# Patient Record
Sex: FEMALE | NOT HISPANIC OR LATINO | URBAN - METROPOLITAN AREA
[De-identification: names, ages, dates, MRNs, and addresses within clinical notes are randomized per-mention and may not be internally consistent; named-entity substitution may affect disease eponyms.]

---

## 2023-07-21 LAB
CHLAMYDIA TRACH., AMPLIFIED: NEGATIVE
N. GONORRHEA, AMPLIFIED: NEGATIVE

## 2023-12-14 ENCOUNTER — APPOINTMENT (OUTPATIENT)
Dept: OBSTETRICS AND GYNECOLOGY | Facility: CLINIC | Age: 23
End: 2023-12-14
Payer: COMMERCIAL

## 2023-12-26 ASSESSMENT — ENCOUNTER SYMPTOMS
SHORTNESS OF BREATH: 0
FEVER: 0
NAUSEA: 0
ABDOMINAL PAIN: 0
DYSURIA: 0
VOMITING: 0
HEADACHES: 0
CHILLS: 0
COUGH: 0
COLOR CHANGE: 0
FATIGUE: 0
DIZZINESS: 0
UNEXPECTED WEIGHT CHANGE: 0

## 2023-12-26 NOTE — PROGRESS NOTES
NP to practice - here for annual?  Saw Janie SEGUN Valadez, MUNDO-LIBRADO 7/19/23 for annual GYN exam?  Pap 7/2023 negative, G/C negative    Subjective   Meenu Mayorga is a 23 y.o. female who is here for a routine exam.     Complaints:***  Periods: regular***  Dysmenorrhea: none***    Current contraception: OCP  History of abnormal Pap smear: no  History of abnormal mammogram: no      OB History    No obstetric history on file.          Review of Systems   Constitutional:  Negative for chills, fatigue, fever and unexpected weight change.   Respiratory:  Negative for cough and shortness of breath.    Gastrointestinal:  Negative for abdominal pain, nausea and vomiting.   Genitourinary:  Negative for dyspareunia, dysuria, pelvic pain and vaginal discharge.   Skin:  Negative for color change and rash.   Neurological:  Negative for dizziness and headaches.       Objective   There were no vitals taken for this visit.       General:   Alert and oriented, in no acute distress   Neck: Supple. No visible thyromegaly.    Breast/Axilla: Normal to palpation bilaterally without masses, skin changes, or nipple discharge.    Abdomen: Soft, non-tender, without masses or organomegaly   Vulva: Normal architecture without erythema, masses, or lesions.    Vagina: Normal mucosa without lesions, masses, or atrophy. No abnormal vaginal discharge.    Cervix: Normal without masses, lesions, or signs of cervicitis   Uterus: Normal, mobile, non-enlarged uterus   Adnexa: Normal without masses or lesions   Pelvic Floor normal   Psych Normal affect. Normal mood.      Assessment/Plan   -UTD on pap smear, next due 7/2025.  -UTD on STI screening, completed 7/2023 negative for both G/C.    Nova Olson PA-C

## 2023-12-27 ENCOUNTER — OFFICE VISIT (OUTPATIENT)
Dept: OBSTETRICS AND GYNECOLOGY | Facility: CLINIC | Age: 23
End: 2023-12-27
Payer: COMMERCIAL

## 2023-12-27 RX ORDER — ETONOGESTREL 68 MG/1
1 IMPLANT SUBCUTANEOUS ONCE
COMMUNITY

## 2023-12-27 ASSESSMENT — LIFESTYLE VARIABLES
SKIP TO QUESTIONS 9-10: 1
AUDIT-C TOTAL SCORE: 2
HOW OFTEN DO YOU HAVE A DRINK CONTAINING ALCOHOL: 2-4 TIMES A MONTH
HOW OFTEN DO YOU HAVE SIX OR MORE DRINKS ON ONE OCCASION: NEVER
HOW MANY STANDARD DRINKS CONTAINING ALCOHOL DO YOU HAVE ON A TYPICAL DAY: 1 OR 2

## 2023-12-27 ASSESSMENT — PATIENT HEALTH QUESTIONNAIRE - PHQ9
2. FEELING DOWN, DEPRESSED OR HOPELESS: NOT AT ALL
1. LITTLE INTEREST OR PLEASURE IN DOING THINGS: NOT AT ALL
SUM OF ALL RESPONSES TO PHQ9 QUESTIONS 1 & 2: 0

## 2023-12-27 ASSESSMENT — ENCOUNTER SYMPTOMS
DEPRESSION: 0
LOSS OF SENSATION IN FEET: 0
OCCASIONAL FEELINGS OF UNSTEADINESS: 0

## 2024-01-16 ASSESSMENT — ENCOUNTER SYMPTOMS
ABDOMINAL PAIN: 0
SHORTNESS OF BREATH: 0
FEVER: 0
FATIGUE: 0

## 2024-01-16 NOTE — PROGRESS NOTES
Subjective   Patient ID: Meenu Mayorga is a 23 y.o. female who presents for Annual Exam (Pt is here for physical and discuss her  sinus issues  /nr).    HPI   Sinus congestion - had sinus surgery 2-3 years ago. Still has chronic nasal congestion daily. Was told she had a deviated septum but did not FU.     Weight - lifetime struggle. Weighs the most she ever has. Exercises regularly - mix of weight training/cardio. Tries to get a lot of protein in her diet, vegetables, limits carbs. Has been trying to cut back on processed foods. +FamHx overweight. She does have nexplanon implant and plans on discontinuing soon.    She admits to obsessions w/weight earlier in her life but this is improving. Previously treated for KAYLI w/celexa but she is no longer on this medication and was improving w/counseling.    HM: pap 7/2023 nml.  Shx:  at [place], not currently sexually active    Review of Systems   Constitutional:  Negative for fatigue and fever.   Respiratory:  Negative for shortness of breath.    Cardiovascular:  Negative for chest pain.   Gastrointestinal:  Negative for abdominal pain.   Skin:  Negative for rash.       Objective   /72   Pulse 59   Wt 77.2 kg (170 lb 3.2 oz)   SpO2 98%   BMI 28.32 kg/m²     Physical Exam  Vitals reviewed.   Constitutional:       Appearance: Normal appearance.   HENT:      Head: Normocephalic and atraumatic.      Right Ear: Tympanic membrane and ear canal normal.      Left Ear: Tympanic membrane and ear canal normal.      Nose: Nose normal.      Mouth/Throat:      Mouth: Mucous membranes are moist.      Pharynx: No oropharyngeal exudate.   Eyes:      Extraocular Movements: Extraocular movements intact.      Conjunctiva/sclera: Conjunctivae normal.      Pupils: Pupils are equal, round, and reactive to light.   Cardiovascular:      Rate and Rhythm: Normal rate and regular rhythm.      Heart sounds: Normal heart sounds.   Pulmonary:      Effort: Pulmonary effort is  normal.      Breath sounds: Normal breath sounds. No wheezing.   Abdominal:      General: There is no distension.      Palpations: Abdomen is soft.      Tenderness: There is no abdominal tenderness.   Musculoskeletal:         General: No tenderness.      Cervical back: Normal range of motion and neck supple.   Skin:     General: Skin is warm and dry.      Findings: No rash.   Neurological:      General: No focal deficit present.      Mental Status: She is alert. Mental status is at baseline.   Psychiatric:         Mood and Affect: Mood normal.         Assessment/Plan   Problem List Items Addressed This Visit    None  Visit Diagnoses         Codes    Routine medical exam    -  Primary Z00.00    Chronic sinusitis, unspecified location     J32.9    Relevant Orders    CT sinus wo IV contrast    Screening for diabetes mellitus     Z13.1    Relevant Orders    Comprehensive Metabolic Panel    Screening, lipid     Z13.220    Relevant Orders    Lipid Panel    Weight gain     R63.5    Relevant Orders    TSH with reflex to Free T4 if abnormal          Given information on functional med to help w/weight loss. Recommend books, podcasts, etc.

## 2024-01-18 ENCOUNTER — OFFICE VISIT (OUTPATIENT)
Dept: PRIMARY CARE | Facility: CLINIC | Age: 24
End: 2024-01-18
Payer: COMMERCIAL

## 2024-01-18 VITALS
OXYGEN SATURATION: 98 % | DIASTOLIC BLOOD PRESSURE: 72 MMHG | WEIGHT: 170.2 LBS | SYSTOLIC BLOOD PRESSURE: 114 MMHG | HEART RATE: 59 BPM | BODY MASS INDEX: 28.32 KG/M2

## 2024-01-18 DIAGNOSIS — R63.5 WEIGHT GAIN: ICD-10-CM

## 2024-01-18 DIAGNOSIS — Z00.00 ROUTINE MEDICAL EXAM: Primary | ICD-10-CM

## 2024-01-18 DIAGNOSIS — Z13.1 SCREENING FOR DIABETES MELLITUS: ICD-10-CM

## 2024-01-18 DIAGNOSIS — Z13.220 SCREENING, LIPID: ICD-10-CM

## 2024-01-18 DIAGNOSIS — J32.9 CHRONIC SINUSITIS, UNSPECIFIED LOCATION: ICD-10-CM

## 2024-01-18 PROCEDURE — 1036F TOBACCO NON-USER: CPT | Performed by: PHYSICIAN ASSISTANT

## 2024-01-18 PROCEDURE — 99385 PREV VISIT NEW AGE 18-39: CPT | Performed by: PHYSICIAN ASSISTANT

## 2024-01-18 ASSESSMENT — COLUMBIA-SUICIDE SEVERITY RATING SCALE - C-SSRS
2. HAVE YOU ACTUALLY HAD ANY THOUGHTS OF KILLING YOURSELF?: NO
1. IN THE PAST MONTH, HAVE YOU WISHED YOU WERE DEAD OR WISHED YOU COULD GO TO SLEEP AND NOT WAKE UP?: NO
6. HAVE YOU EVER DONE ANYTHING, STARTED TO DO ANYTHING, OR PREPARED TO DO ANYTHING TO END YOUR LIFE?: NO

## 2024-01-18 ASSESSMENT — PATIENT HEALTH QUESTIONNAIRE - PHQ9
SUM OF ALL RESPONSES TO PHQ9 QUESTIONS 1 AND 2: 0
1. LITTLE INTEREST OR PLEASURE IN DOING THINGS: NOT AT ALL
2. FEELING DOWN, DEPRESSED OR HOPELESS: NOT AT ALL

## 2024-01-18 ASSESSMENT — PAIN SCALES - GENERAL: PAINLEVEL: 0-NO PAIN

## 2024-01-18 NOTE — PATIENT INSTRUCTIONS
Dr. Rickie Garland  [facility] - Dr. Emeterio Perez    Functional Medicine is a systems biology-based approach that focuses on identifying and addressing the root cause of disease. A diagnosis can be the result of more than one cause. For example, depression can be caused by many different factors, including inflammation. Likewise, a cause such as inflammation may lead to a number of different diagnoses, including depression. The precise manifestation of each cause depends on the individual’s genes, environment, and lifestyle, and only treatments that address the right cause will have lasting benefit beyond symptom suppression.    Dr. Dena Guajardo ([facility])  [phone]  [address] [place] [address]    Opal Adler, NO ([facility])  [phone]  [address] [place] [address]    Dr. Nohemy Rodriguez  [phone] [address] [place] [address]    [facility]  [phone] [address] [place] [address]    Dr. Lanette Post ([facility])  [phone] [address]  [place], [place]

## 2024-02-16 ENCOUNTER — TELEPHONE (OUTPATIENT)
Dept: PRIMARY CARE | Facility: CLINIC | Age: 24
End: 2024-02-16
Payer: COMMERCIAL

## 2024-02-16 DIAGNOSIS — J34.2 DEVIATED SEPTUM: Primary | ICD-10-CM

## 2024-02-16 DIAGNOSIS — J32.0 CHRONIC MAXILLARY SINUSITIS: ICD-10-CM

## 2024-02-16 NOTE — TELEPHONE ENCOUNTER
CT scan shows chronic maxillary sinus disease as well as deviated septum. Recommend FU w/ENT. Referral placed.

## 2024-02-21 ENCOUNTER — APPOINTMENT (OUTPATIENT)
Dept: RADIOLOGY | Facility: CLINIC | Age: 24
End: 2024-02-21
Payer: COMMERCIAL

## 2024-02-26 NOTE — TELEPHONE ENCOUNTER
Called and spoke with pt, who states that she already received results. Also, gave pt referral information as she wanted to call and schedule an appt with ENT.

## 2024-03-20 ENCOUNTER — OFFICE VISIT (OUTPATIENT)
Dept: OBSTETRICS AND GYNECOLOGY | Facility: CLINIC | Age: 24
End: 2024-03-20
Payer: COMMERCIAL

## 2024-03-20 VITALS
BODY MASS INDEX: 27.16 KG/M2 | WEIGHT: 163 LBS | HEIGHT: 65 IN | SYSTOLIC BLOOD PRESSURE: 114 MMHG | DIASTOLIC BLOOD PRESSURE: 75 MMHG

## 2024-03-20 DIAGNOSIS — Z30.46 ENCOUNTER FOR NEXPLANON REMOVAL: Primary | ICD-10-CM

## 2024-03-20 PROCEDURE — 1036F TOBACCO NON-USER: CPT

## 2024-03-20 PROCEDURE — 11982 REMOVE DRUG IMPLANT DEVICE: CPT

## 2024-03-20 ASSESSMENT — ENCOUNTER SYMPTOMS
OCCASIONAL FEELINGS OF UNSTEADINESS: 0
DEPRESSION: 0
LOSS OF SENSATION IN FEET: 0

## 2024-03-20 ASSESSMENT — PATIENT HEALTH QUESTIONNAIRE - PHQ9
SUM OF ALL RESPONSES TO PHQ9 QUESTIONS 1 & 2: 0
2. FEELING DOWN, DEPRESSED OR HOPELESS: NOT AT ALL
1. LITTLE INTEREST OR PLEASURE IN DOING THINGS: NOT AT ALL

## 2024-03-20 ASSESSMENT — LIFESTYLE VARIABLES
HOW MANY STANDARD DRINKS CONTAINING ALCOHOL DO YOU HAVE ON A TYPICAL DAY: 1 OR 2
SKIP TO QUESTIONS 9-10: 1
HOW OFTEN DO YOU HAVE SIX OR MORE DRINKS ON ONE OCCASION: NEVER
HOW OFTEN DO YOU HAVE A DRINK CONTAINING ALCOHOL: MONTHLY OR LESS
AUDIT-C TOTAL SCORE: 1

## 2024-03-20 ASSESSMENT — PAIN SCALES - GENERAL: PAINLEVEL: 0-NO PAIN

## 2024-03-20 NOTE — PROGRESS NOTES
Subjective   Meenu Mayorga is a 23 y.o. female who is here as a new patient to have her Nexplanon removed. Initially inserted 1-1.5 years ago at [facility] (2nd Nexplanon). Patient no longer desires to have Nexplanon; would like to take a break from hormonal birth control methods. Plans to track cycles naturally and use condoms when active.    Nexplanon Removal Note  Procedure:    Implant identified.  Left upper arm prepped with Betadinex3.  1% lidocaine injected at planned incision site.  A vertical incision 2-3 mm was performed with an 11-blade scalpel at the distal end of implant.  There was some difficulty removing implant; assistance was provided by Dr. Dey. The implant was then removed using hemostat.  The implant was inspected and found to be intact and complete and was discarded.  Steri strips and then a bandaid and gauze pressure wrap dressing were applied to the site.  After removal instructions were given and verbally reviewed with the patient who acknowledged her understanding - including ibuprofen and ice to the area prn.       Difficulties with the implant removal procedure?  See noted above    Birth control plans are condoms, however not currently active.      Insertion of Contraceptive Capsule    Date/Time: 3/20/2024 4:39 PM    Performed by: Nova Olson PA-C  Authorized by: Nova Olson PA-C    Consent:     Consent obtained:  Verbal and written    Consent given by:  Patient    Procedural risks discussed:  Bleeding, possible continued pain, infection and damage to other organs    Patient questions answered: yes      Patient agrees, verbalizes understanding, and wants to proceed: yes      Instructions and paperwork completed: yes    Universal Protocol:     Patient states understanding of procedure being performed: yes      Site marked: yes    Indication:     Indication: Presence of non-biodegradable drug delivery implant    Pre-procedure:     Pre-procedure timeout performed: yes      Prepped  with: povidone-iodine      Local anesthetic:  Lidocaine without epinephrine    The site was cleaned and prepped in a sterile fashion: yes    Procedure:     Procedure:  Removal    Small stab incision was made in arm: yes      Left/right:  Left    Site was closed with steri-strips and pressure bandage applied: yes    Comments:      Removed successfully and intact

## 2024-04-02 ENCOUNTER — LAB (OUTPATIENT)
Dept: LAB | Facility: LAB | Age: 24
End: 2024-04-02
Payer: COMMERCIAL

## 2024-04-02 DIAGNOSIS — R63.5 WEIGHT GAIN: ICD-10-CM

## 2024-04-02 DIAGNOSIS — Z13.220 SCREENING, LIPID: ICD-10-CM

## 2024-04-02 DIAGNOSIS — Z13.1 SCREENING FOR DIABETES MELLITUS: ICD-10-CM

## 2024-04-02 LAB
ALBUMIN SERPL BCP-MCNC: 4.2 G/DL (ref 3.4–5)
ALP SERPL-CCNC: 56 U/L (ref 33–110)
ALT SERPL W P-5'-P-CCNC: 16 U/L (ref 7–45)
ANION GAP SERPL CALC-SCNC: 14 MMOL/L (ref 10–20)
AST SERPL W P-5'-P-CCNC: 17 U/L (ref 9–39)
BILIRUB SERPL-MCNC: 0.6 MG/DL (ref 0–1.2)
BUN SERPL-MCNC: 19 MG/DL (ref 6–23)
CALCIUM SERPL-MCNC: 9.3 MG/DL (ref 8.6–10.3)
CHLORIDE SERPL-SCNC: 102 MMOL/L (ref 98–107)
CHOLEST SERPL-MCNC: 146 MG/DL (ref 0–199)
CHOLESTEROL/HDL RATIO: 2.8
CO2 SERPL-SCNC: 28 MMOL/L (ref 21–32)
CREAT SERPL-MCNC: 1.03 MG/DL (ref 0.5–1.05)
EGFRCR SERPLBLD CKD-EPI 2021: 79 ML/MIN/1.73M*2
GLUCOSE SERPL-MCNC: 70 MG/DL (ref 74–99)
HDLC SERPL-MCNC: 52.7 MG/DL
LDLC SERPL CALC-MCNC: 82 MG/DL
NON HDL CHOLESTEROL: 93 MG/DL (ref 0–149)
POTASSIUM SERPL-SCNC: 4.4 MMOL/L (ref 3.5–5.3)
PROT SERPL-MCNC: 6.6 G/DL (ref 6.4–8.2)
SODIUM SERPL-SCNC: 140 MMOL/L (ref 136–145)
TRIGL SERPL-MCNC: 55 MG/DL (ref 0–149)
TSH SERPL-ACNC: 0.96 MIU/L (ref 0.44–3.98)
VLDL: 11 MG/DL (ref 0–40)

## 2024-04-02 PROCEDURE — 80053 COMPREHEN METABOLIC PANEL: CPT

## 2024-04-02 PROCEDURE — 80061 LIPID PANEL: CPT

## 2024-04-02 PROCEDURE — 84443 ASSAY THYROID STIM HORMONE: CPT

## 2024-04-02 PROCEDURE — 36415 COLL VENOUS BLD VENIPUNCTURE: CPT

## 2024-06-18 ENCOUNTER — APPOINTMENT (OUTPATIENT)
Dept: OTOLARYNGOLOGY | Facility: CLINIC | Age: 24
End: 2024-06-18
Payer: COMMERCIAL

## 2024-06-18 VITALS — HEIGHT: 65 IN | WEIGHT: 158 LBS | BODY MASS INDEX: 26.33 KG/M2

## 2024-06-18 DIAGNOSIS — J30.89 SEASONAL AND PERENNIAL ALLERGIC RHINITIS: Primary | ICD-10-CM

## 2024-06-18 DIAGNOSIS — J34.2 DEVIATED NASAL SEPTUM: ICD-10-CM

## 2024-06-18 DIAGNOSIS — R09.81 NASAL CONGESTION: ICD-10-CM

## 2024-06-18 DIAGNOSIS — J30.2 SEASONAL AND PERENNIAL ALLERGIC RHINITIS: Primary | ICD-10-CM

## 2024-06-18 DIAGNOSIS — J32.0 CHRONIC MAXILLARY SINUSITIS: ICD-10-CM

## 2024-06-18 DIAGNOSIS — J34.2 DEVIATED SEPTUM: ICD-10-CM

## 2024-06-18 PROCEDURE — 99203 OFFICE O/P NEW LOW 30 MIN: CPT | Performed by: OTOLARYNGOLOGY

## 2024-06-18 PROCEDURE — 1036F TOBACCO NON-USER: CPT | Performed by: OTOLARYNGOLOGY

## 2024-06-18 PROCEDURE — 31231 NASAL ENDOSCOPY DX: CPT | Performed by: OTOLARYNGOLOGY

## 2024-06-18 RX ORDER — AZELASTINE 1 MG/ML
SPRAY, METERED NASAL
Qty: 1 ML | Refills: 11 | Status: SHIPPED | OUTPATIENT
Start: 2024-06-18

## 2024-06-18 RX ORDER — FLUTICASONE PROPIONATE 50 MCG
SPRAY, SUSPENSION (ML) NASAL
Qty: 1 ML | Refills: 11 | Status: SHIPPED | OUTPATIENT
Start: 2024-06-18

## 2024-06-18 NOTE — PROGRESS NOTES
"Chief Complaint   Patient presents with    New Patient Visit     NP- DNS, SINUS ISSUES     HPI:  Meenu Mayorga is a 23 y.o. female who complains of chronic problems with difficulty breathing through her nose.  She think she has some significant allergy symptoms as well especially in the spring.  She does get occasional sinus infection with some nasal drainage.  However this is much improved since she had endoscopic sinus surgery with maxillary nephrostomies and ethmoidectomy in 2020 with Dr. Gastelum.  She is not any current medications for her nose or sinuses other than saline flushing.    PMH:  History reviewed. No pertinent past medical history.  Past Surgical History:   Procedure Laterality Date    OTHER SURGICAL HISTORY  10/16/2020    Sinus surgery         Medications:     Current Outpatient Medications:     etonogestrel-eluting contraceptive (Nexplanon) 68 mg implant implant, 1 each by subdermal route 1 time. INSERTED 02/2022--THROUGH , Disp: , Rfl:     azelastine (Astelin) 137 mcg (0.1 %) nasal spray, 2 sprays each nostril twice daily, 1 bottle, 11 refills, Disp: 1 mL, Rfl: 11    fluticasone (Flonase) 50 mcg/actuation nasal spray, 2 sprays each nostril once daily, 1 bottle, 11 refills, Disp: 1 mL, Rfl: 11     Allergies:  No Known Allergies     ROS:  Review of systems normal unless stated otherwise in the HPI and/or PMH.    Physical Exam:  Height 1.651 m (5' 5\"), weight 71.7 kg (158 lb). Body mass index is 26.29 kg/m².     GENERAL APPEARANCE: Well developed and well nourished.  Alert and oriented in no acute distress.  Normal vocal quality.      HEAD/FACE: No erythema or edema or facial tenderness.  Normal facial nerve function bilaterally.    EAR:       EXTERNAL: Normal pinnas and external auditory canals without lesion or obstructing wax.       MIDDLE EAR: Tympanic membranes intact and mobile with normal landmarks.  Middle ear space appears well aerated.       TUBE STATUS: N/A       MASTOID CAVITY: N/A       " HEARING: Gross hearing assessment is within normal limits.      NOSE:       VISUALIZED USING: Anterior rhinoscopy with headlight and nasal speculum.  Flexible scoping performed       DORSUM: Midline, nontraumatic appearance.       MUCOSA: Normal-appearing.       SECRETIONS: Normal.       SEPTUM: Left deviation.  Cartilage of the caudally to the left.  The majority however is a significant spur which goes into the left middle turbinate.       INFERIOR TURBINATES: Mild hypertrophy       MIDDLE TURBINATES/MEATUS: Surgical antrostomies are open and dry       BLEEDING: N/A         ORAL CAVITY/PHARYNX:       TEETH: Adequate dentition.       TONGUE: No mass or lesion.  Normal mobility.       FLOOR OF MOUTH: No mass or lesion.       PALATE: Normal hard palate, soft palate, and uvula.       OROPHARYNX: Normal without mass or lesion.       BUCCAL MUCOSA/GBS: Normal without mass or lesion.       LIPS: Normal.    LARYNX/HYPOPHARYNX/NASOPHARYNX: Normal nasopharynx    NECK: No palpable masses or abnormal adenopathy.  Trachea is midline.    THYROID: No thyromegaly or palpable nodule.    SALIVARY GLANDS: Normal bilateral parotid and submandibular glands by inspection and palpation.    TMJ's: Normal.    NEURO: Cranial nerve exam grossly normal bilaterally.       Assessment/Plan   Meenu was seen today for new patient visit.  Diagnoses and all orders for this visit:  Seasonal and perennial allergic rhinitis (Primary)  -     azelastine (Astelin) 137 mcg (0.1 %) nasal spray; 2 sprays each nostril twice daily, 1 bottle, 11 refills  Deviated septum  -     Referral to ENT  Chronic maxillary sinusitis  -     Referral to ENT  Deviated nasal septum  Nasal congestion  -     fluticasone (Flonase) 50 mcg/actuation nasal spray; 2 sprays each nostril once daily, 1 bottle, 11 refills     Educated.  She has options of trying to medications, more aggressive allergy therapy including avoidance and immunotherapy, or surgical therapy with deviated  septum and turbinate reduction.  She is moving to [place] for a new job at the end of July so any immunotherapy or surgical therapy is off the list for now.  In the meantime we will go ahead and try some medications in the form of azelastine and Flonase and see if that helps her therefore she can at least have this trial done when she meets with a new ENT in [place] if necessary  Follow up if symptoms worsen or fail to improve.     Luis Carlos Rodriguez MD